# Patient Record
(demographics unavailable — no encounter records)

---

## 2024-12-03 NOTE — REASON FOR VISIT
no [Follow-Up: _____] : a [unfilled] follow-up visit  [Patient] : patient [Parents] : parents [Medical Records] : medical records

## 2024-12-03 NOTE — PHYSICAL EXAM
[Healthy Appearing] : healthy appearing [Well Nourished] : well nourished [Interactive] : interactive [Normal Appearance] : normal appearance [Well formed] : well formed [Normally Set] : normally set [Normal S1 and S2] : normal S1 and S2 [Murmur] : no murmurs [Clear to Ausculation Bilaterally] : clear to auscultation bilaterally [Abdomen Soft] : soft [Abdomen Tenderness] : non-tender [] : no hepatosplenomegaly [Normal] : normal  [de-identified] : insertion sites without lipoatrophy or lipohypertrophy. [de-identified] : deferred

## 2024-12-03 NOTE — HISTORY OF PRESENT ILLNESS
[Other: ___] :  blood sugar levels are tested [unfilled] times per day [2-3] : the pump insertion site is changed every 2 - 3 days [Arms] : arms [Legs] : legs [Abdomen] : abdomen [_____ times per week] : mild symptoms occuring [unfilled] time(s) per week [_____ times per month] : severe symptoms occuring [unfilled] time(s) per month [Glucagon at Home] : has glucagon at home [Regular Periods] : regular periods [Previous Hypoglycemic Seizure] : has no history of hypoglycemic seizure [FreeTextEntry2] : Elizabeth is a 14y8m old girl with T1DM presenting for follow up. She manages her diabetes with the Omnipod 5 and Dexcom G6. She was last seen on 7/18/24. A1c was 8.3%. Annual labs were done, significant for ACR 39 (high), which normalized to <1.2 upon repeat first morning urine.  Since her last visit, ELIZABETH has been well with no recent illness or hospitalization. She is in the 9th grade and goes to the nurse before lunch. She boluses 2 minutes before she eats because she has a short lunch period. She feels her boluses at lunch do not work well. Elizabeth does not eat breakfast. Her first meal of the day is lunch around 10:30am, and then she has dinner around 5:30pm. She goes to sleep around 7-8pm because she has to wake up early for school, at 4:45 am. On non-school nights she stays up late. She once gave herself a needed correction at 12am and developed hypoglycemia.  Glooko + Dexcom Review 72% basal 46.1 u 28% bolus 18u 64.1u insulin/day 0% overrides 4.1 boluses/day 161.4g carbs/day 3.2 entries/day  Auto mode 99% limited mode 4% manual mode 1%  20% very high 23% high 56% in range 1% low 0% very low

## 2024-12-03 NOTE — CONSULT LETTER
[Dear  ___] : Dear  [unfilled], [Consult Letter:] : I had the pleasure of evaluating your patient, [unfilled]. [Please see my note below.] : Please see my note below. [Consult Closing:] : Thank you very much for allowing me to participate in the care of this patient.  If you have any questions, please do not hesitate to contact me. [Sincerely,] : Sincerely, [FreeTextEntry3] : Bella Sunshine MD [facility] Physician [facility] Division of Pediatric Endocrinology

## 2024-12-03 NOTE — SCHOOL
[Type 1 Diabetes] : Type 1 Diabetes [1 mg] : 1  mg SC/IM [___ PROVIDER INITIALS] : : ___[unfilled] [_____] : _x _ Insulin name: [unfilled] [______] : - Brand: [unfilled] [] : _x [Dr. Bella Sunshine] : Dr. Bella Sunshine - License [id number] [[place] [facility]] : 1991 [facility], [facility] [address], [place] [address] [Today's Date] : [unfilled] [FreeTextEntry6] : 12/3/24 [FreeTextEntry7] : 8%

## 2024-12-03 NOTE — CONSULT LETTER
[Dear  ___] : Dear  [unfilled], [Consult Letter:] : I had the pleasure of evaluating your patient, [unfilled]. [Please see my note below.] : Please see my note below. [Consult Closing:] : Thank you very much for allowing me to participate in the care of this patient.  If you have any questions, please do not hesitate to contact me. [Sincerely,] : Sincerely, [FreeTextEntry3] : Bella Sunshine MD [facility] Physician [facility] Division of Pediatric Endocrinology

## 2024-12-03 NOTE — SCHOOL
[Type 1 Diabetes] : Type 1 Diabetes [1 mg] : 1  mg SC/IM [___ PROVIDER INITIALS] : : ___[unfilled] [_____] : _x _ Insulin name: [unfilled] [______] : - Brand: [unfilled] [] : _x [Dr. Bella Sunshine] : Dr. Bella Sunshine - License [id number] [[place] Office] : 1991 [facility] [address], [place] [address] [Today's Date] : [unfilled] [FreeTextEntry6] : 12/3/24 [FreeTextEntry7] : 8%

## 2024-12-03 NOTE — PHYSICAL EXAM
[Healthy Appearing] : healthy appearing [Well Nourished] : well nourished [Interactive] : interactive [Normal Appearance] : normal appearance [Well formed] : well formed [Normally Set] : normally set [Normal S1 and S2] : normal S1 and S2 [Murmur] : no murmurs [Clear to Ausculation Bilaterally] : clear to auscultation bilaterally [Abdomen Soft] : soft [Abdomen Tenderness] : non-tender [] : no hepatosplenomegaly [Normal] : normal  [de-identified] : insertion sites without lipoatrophy or lipohypertrophy. [de-identified] : deferred

## 2025-05-22 NOTE — THERAPY
[Today's Date] : [unfilled] [Humalog] : Humalog [_____] :  [unfilled] units/hour [Carbohydrate Ratio:                  1 unit for every ___ grams of carbohydrates] : Carbohydrate Ratio: 1 unit for every [unfilled] grams of carbohydrates [BG Target = ____] : BG Target = [unfilled] [Insulin Sensitivity Factor = ____] : Insulin Sensitivity Factor = [unfilled]

## 2025-05-26 NOTE — HISTORY OF PRESENT ILLNESS
[Regular Periods] : regular periods [Other: ___] :  blood sugar levels are tested [unfilled] times per day [Arms] : arms [Legs] : legs [Glucagon at Home] : has glucagon at home [Previous Hypoglycemic Seizure] : has no history of hypoglycemic seizure [FreeTextEntry2] : Elizabeth is a 15 year 2 month old female with type 1 diabetes. She was diagnosed not in DKA in 12/2020 after presenting with polyuria, polydipsia and weight loss. A1C was 14%. She follows with Dr. Sunshine and was last seen in 12/2024. A1C was 8%. Screening labs in 7-8/2024 were normal.   She is here today for follow up and her A1C is 7.7%. Review of her Omnipod output shows her average blood sugar is 194 mg/dL+- 82. She is in range 52% of time, high 24%, very high 23%, low 0% and very low 1%. She is wearing the Dexcom 89.2% of time. GMI 7.9%. Her average daily insulin dose is 71.1 units/day, she gives 3.1 bolus/day. Her basal insulin is 69% (49.4 units) and her bolus insulin is 31% (21.7 units). She eats in average 167.3 grams of carbs/day. She is on automated mode 96% of time. Review of the daily reports shows that she does not bolus for some meals.

## 2025-05-26 NOTE — CONSULT LETTER
[Dear  ___] : Dear  [unfilled], [Courtesy Letter:] : I had the pleasure of seeing your patient, [unfilled], in my office today. [Please see my note below.] : Please see my note below. [Consult Closing:] : Thank you very much for allowing me to participate in the care of this patient.  If you have any questions, please do not hesitate to contact me. [Sincerely,] : Sincerely, [FreeTextEntry3] : Valeria Blankenship MD [facility] Division of Pediatric Endocrinology  [facility] \[facility]\""/[facility]

## 2025-05-26 NOTE — ASSESSMENT
[FreeTextEntry1] : Elizabeth is a 15 year 2 month old female with type 1 diabetes. a1C today is 7.7%. Review of the daily reports shows that she does not bolus for some meals. I explained that is why her basal insulin dose is much higher than the bolus. When she does bolus, especially if it is prior to the meal, the blood sugars normalize. Therefore, I did not make changes to her regimen. I increased her max bolus from 12 u to 15 u. Follow up in 3 months. I ordered screening blood work to be done before the next visit.

## 2025-05-26 NOTE — CONSULT LETTER
[Dear  ___] : Dear  [unfilled], [Courtesy Letter:] : I had the pleasure of seeing your patient, [unfilled], in my office today. [Please see my note below.] : Please see my note below. [Consult Closing:] : Thank you very much for allowing me to participate in the care of this patient.  If you have any questions, please do not hesitate to contact me. [Sincerely,] : Sincerely, [FreeTextEntry3] : Valeria Blankenship MD [facility] Division of Pediatric Endocrinology  [facility]